# Patient Record
Sex: MALE
[De-identification: names, ages, dates, MRNs, and addresses within clinical notes are randomized per-mention and may not be internally consistent; named-entity substitution may affect disease eponyms.]

---

## 2018-03-16 PROBLEM — Z00.00 ENCOUNTER FOR PREVENTIVE HEALTH EXAMINATION: Status: ACTIVE | Noted: 2018-03-16

## 2020-06-28 ENCOUNTER — TRANSCRIPTION ENCOUNTER (OUTPATIENT)
Age: 40
End: 2020-06-28

## 2023-05-19 ENCOUNTER — APPOINTMENT (OUTPATIENT)
Dept: ORTHOPEDIC SURGERY | Facility: CLINIC | Age: 43
End: 2023-05-19
Payer: COMMERCIAL

## 2023-05-19 VITALS — HEIGHT: 72 IN | BODY MASS INDEX: 33.18 KG/M2 | WEIGHT: 245 LBS

## 2023-05-19 DIAGNOSIS — Z78.9 OTHER SPECIFIED HEALTH STATUS: ICD-10-CM

## 2023-05-19 DIAGNOSIS — M67.814 OTHER SPECIFIED DISORDERS OF TENDON, LEFT SHOULDER: ICD-10-CM

## 2023-05-19 DIAGNOSIS — S46.812A STRAIN OF OTHER MUSCLES, FASCIA AND TENDONS AT SHOULDER AND UPPER ARM LEVEL, LEFT ARM, INITIAL ENCOUNTER: ICD-10-CM

## 2023-05-19 DIAGNOSIS — I10 ESSENTIAL (PRIMARY) HYPERTENSION: ICD-10-CM

## 2023-05-19 DIAGNOSIS — Z98.890 OTHER SPECIFIED POSTPROCEDURAL STATES: ICD-10-CM

## 2023-05-19 DIAGNOSIS — M75.42 IMPINGEMENT SYNDROME OF LEFT SHOULDER: ICD-10-CM

## 2023-05-19 PROCEDURE — 73030 X-RAY EXAM OF SHOULDER: CPT | Mod: LT

## 2023-05-19 PROCEDURE — 73010 X-RAY EXAM OF SHOULDER BLADE: CPT | Mod: LT

## 2023-05-19 PROCEDURE — 99214 OFFICE O/P EST MOD 30 MIN: CPT

## 2023-05-19 RX ORDER — METHYLPREDNISOLONE 4 MG/1
4 TABLET ORAL
Qty: 1 | Refills: 0 | Status: ACTIVE | COMMUNITY
Start: 2023-05-19 | End: 1900-01-01

## 2023-05-19 RX ORDER — CHLORTHALIDONE 50 MG/1
TABLET ORAL
Refills: 0 | Status: ACTIVE | COMMUNITY

## 2023-05-19 RX ORDER — ATENOLOL 50 MG/1
TABLET ORAL
Refills: 0 | Status: ACTIVE | COMMUNITY

## 2023-05-19 RX ORDER — LOSARTAN POTASSIUM 100 MG/1
TABLET, FILM COATED ORAL
Refills: 0 | Status: ACTIVE | COMMUNITY

## 2023-05-19 NOTE — REASON FOR VISIT
[FreeTextEntry2] : This is a 42 year old RHD male  with left shoulder pain since 5/16/2023 after trying to preformed  press with a 65lb dumbbell. followed by sudden pain. 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 Alaniz.  Reaching is painful. There was Night symptoms. There is no n/t. NSAID use as needed with temporary relief.\par

## 2023-05-19 NOTE — ASSESSMENT
[FreeTextEntry1] : We discussed the underlying pathology. \par Treatment options reviewed. \par Start PT. \par Medrol is prescribed\par If symptoms persist, and MRI is indicated. \par Stop Celebrex while on Medrol. \par Cautions discussed. \par Questions answered.\par \par The documentation recorded by the scribe accurately reflects the service I personally performed and the decisions made by me.\par Entered by Mohsen Ferguson acting as scribe.\par \par Patient seen by Adolph Alaniz M.D.\par Dr. Adolph Alaniz\par Shoulder Surgery\par \par

## 2023-05-19 NOTE — PHYSICAL EXAM
[Left] : left shoulder [Mild] : mild [5 ___] : forward flexion 5[unfilled]/5 [5___] : external rotation 5[unfilled]/5 [] : no sensory deficits [Right] : right shoulder [Sitting] : sitting [FreeTextEntry8] : Point tender on the less tuberosity.  [de-identified] : Belly press seems ok. Lift off is positive.  [TWNoteComboBox4] : passive forward flexion 150 degrees [TWNoteComboBox6] : internal rotation L4 [de-identified] : external rotation 50 degrees

## 2023-05-19 NOTE — CONSULT LETTER
[Dear  ___] : Dear  [unfilled], [Consult Letter:] : I had the pleasure of evaluating your patient, [unfilled]. [Please see my note below.] : Please see my note below. [Consult Closing:] : Thank you very much for allowing me to participate in the care of this patient.  If you have any questions, please do not hesitate to contact me. [Sincerely,] : Sincerely, [FreeTextEntry3] : Dr. Adolph Alaniz\par Shoulder Surgery

## 2023-05-19 NOTE — IMAGING
[Left] : left shoulder [FreeTextEntry1] : There are 3 metal anchors anterior, inferior glenoid rim.  [FreeTextEntry5] : There evidence of DCE with superior ossicle. There is a type II-III acromion.

## 2023-05-19 NOTE — HISTORY OF PRESENT ILLNESS
[4] : 4 [Dull/Aching] : dull/aching [Localized] : localized [Constant] : constant [Sleep] : sleep [Nothing helps with pain getting better] : Nothing helps with pain getting better [de-identified] : LUCY RALPH is a 42 year old male returning with left shoulder pain. Had left shoulder surgery in 1997. Injured shoulder while working out on 05/16/2023. Localized.  [] : no [FreeTextEntry1] : left shoulder

## 2023-06-30 ENCOUNTER — APPOINTMENT (OUTPATIENT)
Dept: ORTHOPEDIC SURGERY | Facility: CLINIC | Age: 43
End: 2023-06-30